# Patient Record
Sex: MALE | Race: NATIVE HAWAIIAN OR OTHER PACIFIC ISLANDER | HISPANIC OR LATINO | Employment: FULL TIME | URBAN - METROPOLITAN AREA
[De-identification: names, ages, dates, MRNs, and addresses within clinical notes are randomized per-mention and may not be internally consistent; named-entity substitution may affect disease eponyms.]

---

## 2018-07-12 RX ORDER — MICONAZOLE NITRATE 20 MG/G
CREAM TOPICAL AS NEEDED
Refills: 0 | COMMUNITY
Start: 2018-05-22 | End: 2019-05-30 | Stop reason: SDUPTHER

## 2018-07-13 ENCOUNTER — OFFICE VISIT (OUTPATIENT)
Dept: FAMILY MEDICINE CLINIC | Facility: CLINIC | Age: 28
End: 2018-07-13
Payer: COMMERCIAL

## 2018-07-13 VITALS
SYSTOLIC BLOOD PRESSURE: 114 MMHG | TEMPERATURE: 97.4 F | WEIGHT: 203.9 LBS | DIASTOLIC BLOOD PRESSURE: 82 MMHG | HEIGHT: 69 IN | OXYGEN SATURATION: 97 % | HEART RATE: 78 BPM | RESPIRATION RATE: 18 BRPM | BODY MASS INDEX: 30.2 KG/M2

## 2018-07-13 DIAGNOSIS — B37.9 YEAST INFECTION: ICD-10-CM

## 2018-07-13 DIAGNOSIS — R21: Primary | Chronic | ICD-10-CM

## 2018-07-13 LAB — GLUCOSE P FAST SERPL-MCNC: 67 MG/DL (ref 65–99)

## 2018-07-13 PROCEDURE — 99213 OFFICE O/P EST LOW 20 MIN: CPT | Performed by: FAMILY MEDICINE

## 2018-07-13 PROCEDURE — 82947 ASSAY GLUCOSE BLOOD QUANT: CPT | Performed by: FAMILY MEDICINE

## 2018-07-13 PROCEDURE — 36415 COLL VENOUS BLD VENIPUNCTURE: CPT | Performed by: FAMILY MEDICINE

## 2018-07-13 PROCEDURE — 3008F BODY MASS INDEX DOCD: CPT | Performed by: FAMILY MEDICINE

## 2018-07-13 RX ORDER — FLUCONAZOLE 150 MG/1
150 TABLET ORAL ONCE
Qty: 2 TABLET | Refills: 0 | Status: SHIPPED | OUTPATIENT
Start: 2018-07-13 | End: 2018-07-13

## 2018-07-13 NOTE — PROGRESS NOTES
Assessment/Plan:      Diagnoses and all orders for this visit:    Penile eruption  Comments:  Sexual partner had a yeast infection 2 months ago and again 2 daysago  She was treated with fluconazole, twice  Patient was treated with a cream 2 months ago  Other orders  -     ANTIFUNGAL 2 % cream; as needed          Subjective:     Patient ID: Dawn Brunson is a 33 y o  male  Patient tells me that for months he has had a penile rash which seems to come out after intercourse and if he does not shower  It has been this way for months but more recently the rash has been there constantly  It is itchy and is on the head of the penis and the distal shaft  The rest of the history is as noted-he is partner was diagnosed twice in the last 2 months with the a vaginal yeast infection  The most recent was 2 days ago and she is also being treated with Diflucan 150 mg x1 dose and then is to be repeated in 3 days  He he does not wear a condom when they have intercourse  He denies any penile discharge or other penile lesions  Review of Systems   Constitutional: Negative for activity change, appetite change, chills, fatigue and fever  HENT: Negative for congestion, hearing loss and sneezing  Eyes: Negative for discharge and visual disturbance  Respiratory: Negative for cough and wheezing  Gastrointestinal: Negative for abdominal pain, constipation, diarrhea, nausea and vomiting  Endocrine: Negative for polydipsia and polyuria  Genitourinary: Negative for discharge, dysuria, frequency, genital sores, hematuria, penile pain, penile swelling, scrotal swelling and testicular pain  Musculoskeletal: Negative for arthralgias  Skin: Negative for rash  Allergic/Immunologic: Negative for environmental allergies and food allergies  Neurological: Negative for headaches  Hematological: Negative for adenopathy  Psychiatric/Behavioral: Negative for behavioral problems and sleep disturbance  Objective:     Physical Exam   Abdominal: Soft  Bowel sounds are normal    Genitourinary: No penile tenderness  Musculoskeletal: Normal range of motion  Skin: Skin is warm and dry  Vitals reviewed

## 2018-07-13 NOTE — PATIENT INSTRUCTIONS
I discussed the situation with he and his partner  She also had an HIV test done which was negative  He refuses to have that done today  There is no question that the rash on his penis is a yeast infection  The rash did not respond to topical treatment  He and his partner are most likely playing ping-pong with the yeast infection  He tells me that his grandmother had diabetes so we need to check her blood sugar to make sure that is okay  He can be treated with fluconazole 150 mg orally x1 dose and then repeat dose in 3 days  He will call me if it does not work or if the rash comes back

## 2018-07-18 ENCOUNTER — TELEPHONE (OUTPATIENT)
Dept: FAMILY MEDICINE CLINIC | Facility: CLINIC | Age: 28
End: 2018-07-18

## 2019-05-30 ENCOUNTER — TELEPHONE (OUTPATIENT)
Dept: FAMILY MEDICINE CLINIC | Facility: CLINIC | Age: 29
End: 2019-05-30

## 2019-05-30 DIAGNOSIS — B37.9 YEAST INFECTION: Primary | ICD-10-CM

## 2019-05-30 RX ORDER — MICONAZOLE NITRATE 20 MG/G
CREAM TOPICAL 2 TIMES DAILY
Qty: 28.35 G | Refills: 3 | Status: SHIPPED | OUTPATIENT
Start: 2019-05-30 | End: 2019-10-21 | Stop reason: ALTCHOICE

## 2019-10-21 ENCOUNTER — OFFICE VISIT (OUTPATIENT)
Dept: FAMILY MEDICINE CLINIC | Facility: CLINIC | Age: 29
End: 2019-10-21
Payer: COMMERCIAL

## 2019-10-21 ENCOUNTER — TREATMENT (OUTPATIENT)
Dept: FAMILY MEDICINE CLINIC | Facility: CLINIC | Age: 29
End: 2019-10-21

## 2019-10-21 VITALS
TEMPERATURE: 97.8 F | BODY MASS INDEX: 34.42 KG/M2 | WEIGHT: 232.4 LBS | HEART RATE: 104 BPM | HEIGHT: 69 IN | SYSTOLIC BLOOD PRESSURE: 140 MMHG | DIASTOLIC BLOOD PRESSURE: 90 MMHG | OXYGEN SATURATION: 96 %

## 2019-10-21 DIAGNOSIS — L21.9 SEBORRHEIC DERMATITIS: ICD-10-CM

## 2019-10-21 DIAGNOSIS — Z23 IMMUNIZATION DUE: Primary | ICD-10-CM

## 2019-10-21 DIAGNOSIS — N52.9 ERECTILE DYSFUNCTION, UNSPECIFIED ERECTILE DYSFUNCTION TYPE: Primary | ICD-10-CM

## 2019-10-21 DIAGNOSIS — N52.9 ERECTILE DYSFUNCTION, UNSPECIFIED ERECTILE DYSFUNCTION TYPE: ICD-10-CM

## 2019-10-21 LAB
EST. AVERAGE GLUCOSE BLD GHB EST-MCNC: 108 MG/DL
HBA1C MFR BLD: 5.4 % (ref 4.2–6.3)
TESTOST SERPL-MCNC: 133 NG/DL (ref 113–1065)
TSH SERPL DL<=0.05 MIU/L-ACNC: 2.78 UIU/ML (ref 0.36–3.74)

## 2019-10-21 PROCEDURE — 36415 COLL VENOUS BLD VENIPUNCTURE: CPT | Performed by: FAMILY MEDICINE

## 2019-10-21 PROCEDURE — 90471 IMMUNIZATION ADMIN: CPT

## 2019-10-21 PROCEDURE — 3008F BODY MASS INDEX DOCD: CPT | Performed by: FAMILY MEDICINE

## 2019-10-21 PROCEDURE — 99214 OFFICE O/P EST MOD 30 MIN: CPT | Performed by: FAMILY MEDICINE

## 2019-10-21 PROCEDURE — 84443 ASSAY THYROID STIM HORMONE: CPT | Performed by: FAMILY MEDICINE

## 2019-10-21 PROCEDURE — 83036 HEMOGLOBIN GLYCOSYLATED A1C: CPT | Performed by: FAMILY MEDICINE

## 2019-10-21 PROCEDURE — 90682 RIV4 VACC RECOMBINANT DNA IM: CPT

## 2019-10-21 PROCEDURE — 84403 ASSAY OF TOTAL TESTOSTERONE: CPT | Performed by: FAMILY MEDICINE

## 2019-10-21 RX ORDER — BETAMETHASONE VALERATE 0.1 %
LOTION (ML) TOPICAL
COMMUNITY
End: 2019-10-21

## 2019-10-21 RX ORDER — HYDROCORTISONE 25 MG/ML
LOTION TOPICAL 2 TIMES DAILY
Qty: 59 ML | Refills: 12 | Status: SHIPPED | OUTPATIENT
Start: 2019-10-21

## 2019-10-21 NOTE — PATIENT INSTRUCTIONS
For the scaly scalp condition use alternating dandruff shampoos  Good choices would be T/Gel Sebulex head and shoulders or Selsun Blue      Use the hydrocortisone to have percent lotion every day morning and evening for the face and you can also use it is necessary for the scalp    Return 3 wks to discuss various options for ED

## 2019-10-21 NOTE — PROGRESS NOTES
Assessment/Plan:    No problem-specific Assessment & Plan notes found for this encounter  Diagnoses and all orders for this visit:    Immunization due  -     influenza vaccine, 2982-3913, quadrivalent, recombinant, PF, 0 5 mL, for patients 18 yr+ (FLUBLOK)    Erectile dysfunction, unspecified erectile dysfunction type  -     Testosterone; Future  -     Hemoglobin A1C  -     TSH, 3rd generation with Free T4 reflex  -     hydrocortisone 2 5 % lotion; Apply topically 2 (two) times a day    Seborrheic dermatitis    Other orders  -     Discontinue: betamethasone valerate (VALISONE) 0 1 % lotion; Apply topically Once every three days           Subjective:      Patient ID: Bharathi Fontenot is a 30 y o  male  Rash : facial / scalp : itchy + flakey : responds well to potent steroid creams ;     E D for yrs ; occ gets decent erection; The following portions of the patient's history were reviewed and updated as appropriate: allergies, current medications, past family history, past medical history, past social history, past surgical history and problem list     Review of Systems      Objective:  Vitals:    10/21/19 0843   BP: 140/90   BP Location: Left arm   Patient Position: Sitting   Cuff Size: Large   Pulse: 104   Temp: 97 8 °F (36 6 °C)   TempSrc: Temporal   SpO2: 96%   Weight: 105 kg (232 lb 6 4 oz)   Height: 5' 9 29" (1 76 m)      Physical Exam   Constitutional: He is oriented to person, place, and time  He appears well-developed and well-nourished  HENT:   Head: Normocephalic and atraumatic  Eyes: Conjunctivae are normal    Neurological: He is alert and oriented to person, place, and time  Skin: Skin is warm and dry  He is not diaphoretic  Seborrheic derm face + scalp   Psychiatric: He has a normal mood and affect

## 2020-01-24 ENCOUNTER — TELEPHONE (OUTPATIENT)
Dept: FAMILY MEDICINE CLINIC | Facility: CLINIC | Age: 30
End: 2020-01-24

## 2021-05-04 ENCOUNTER — APPOINTMENT (OUTPATIENT)
Dept: LAB | Facility: CLINIC | Age: 31
End: 2021-05-04
Payer: COMMERCIAL

## 2021-05-04 ENCOUNTER — TRANSCRIBE ORDERS (OUTPATIENT)
Dept: LAB | Facility: CLINIC | Age: 31
End: 2021-05-04

## 2021-05-04 DIAGNOSIS — R14.0 GASTRIC TYMPANY: ICD-10-CM

## 2021-05-04 DIAGNOSIS — R68.82 DECREASED LIBIDO: ICD-10-CM

## 2021-05-04 DIAGNOSIS — Z00.00 ROUTINE GENERAL MEDICAL EXAMINATION AT A HEALTH CARE FACILITY: ICD-10-CM

## 2021-05-04 DIAGNOSIS — E55.9 AVITAMINOSIS D: ICD-10-CM

## 2021-05-04 DIAGNOSIS — R14.0 GASTRIC TYMPANY: Primary | ICD-10-CM

## 2021-05-04 LAB
25(OH)D3 SERPL-MCNC: 26.1 NG/ML (ref 30–100)
ALBUMIN SERPL BCP-MCNC: 4 G/DL (ref 3.5–5)
ALP SERPL-CCNC: 86 U/L (ref 46–116)
ALT SERPL W P-5'-P-CCNC: 48 U/L (ref 12–78)
ANION GAP SERPL CALCULATED.3IONS-SCNC: 5 MMOL/L (ref 4–13)
AST SERPL W P-5'-P-CCNC: 23 U/L (ref 5–45)
BASOPHILS # BLD AUTO: 0.05 THOUSANDS/ΜL (ref 0–0.1)
BASOPHILS NFR BLD AUTO: 0 % (ref 0–1)
BILIRUB SERPL-MCNC: 1.03 MG/DL (ref 0.2–1)
BILIRUB UR QL STRIP: NEGATIVE
BUN SERPL-MCNC: 16 MG/DL (ref 5–25)
CALCIUM SERPL-MCNC: 8.9 MG/DL (ref 8.3–10.1)
CHLORIDE SERPL-SCNC: 109 MMOL/L (ref 100–108)
CHOLEST SERPL-MCNC: 207 MG/DL (ref 50–200)
CLARITY UR: CLEAR
CO2 SERPL-SCNC: 23 MMOL/L (ref 21–32)
COLOR UR: NORMAL
CREAT SERPL-MCNC: 0.78 MG/DL (ref 0.6–1.3)
EOSINOPHIL # BLD AUTO: 0.19 THOUSAND/ΜL (ref 0–0.61)
EOSINOPHIL NFR BLD AUTO: 2 % (ref 0–6)
ERYTHROCYTE [DISTWIDTH] IN BLOOD BY AUTOMATED COUNT: 13.4 % (ref 11.6–15.1)
GFR SERPL CREATININE-BSD FRML MDRD: 121 ML/MIN/1.73SQ M
GLUCOSE P FAST SERPL-MCNC: 98 MG/DL (ref 65–99)
GLUCOSE UR STRIP-MCNC: NEGATIVE MG/DL
HCT VFR BLD AUTO: 51.4 % (ref 36.5–49.3)
HDLC SERPL-MCNC: 30 MG/DL
HGB BLD-MCNC: 16.6 G/DL (ref 12–17)
HGB UR QL STRIP.AUTO: NEGATIVE
IMM GRANULOCYTES # BLD AUTO: 0.1 THOUSAND/UL (ref 0–0.2)
IMM GRANULOCYTES NFR BLD AUTO: 1 % (ref 0–2)
KETONES UR STRIP-MCNC: NEGATIVE MG/DL
LDLC SERPL CALC-MCNC: 154 MG/DL (ref 0–100)
LEUKOCYTE ESTERASE UR QL STRIP: NEGATIVE
LYMPHOCYTES # BLD AUTO: 2.57 THOUSANDS/ΜL (ref 0.6–4.47)
LYMPHOCYTES NFR BLD AUTO: 21 % (ref 14–44)
MCH RBC QN AUTO: 28.6 PG (ref 26.8–34.3)
MCHC RBC AUTO-ENTMCNC: 32.3 G/DL (ref 31.4–37.4)
MCV RBC AUTO: 89 FL (ref 82–98)
MONOCYTES # BLD AUTO: 0.89 THOUSAND/ΜL (ref 0.17–1.22)
MONOCYTES NFR BLD AUTO: 7 % (ref 4–12)
NEUTROPHILS # BLD AUTO: 8.37 THOUSANDS/ΜL (ref 1.85–7.62)
NEUTS SEG NFR BLD AUTO: 69 % (ref 43–75)
NITRITE UR QL STRIP: NEGATIVE
NONHDLC SERPL-MCNC: 177 MG/DL
NRBC BLD AUTO-RTO: 0 /100 WBCS
PH UR STRIP.AUTO: 6.5 [PH]
PLATELET # BLD AUTO: 415 THOUSANDS/UL (ref 149–390)
PMV BLD AUTO: 8.9 FL (ref 8.9–12.7)
POTASSIUM SERPL-SCNC: 3.9 MMOL/L (ref 3.5–5.3)
PROT SERPL-MCNC: 8.2 G/DL (ref 6.4–8.2)
PROT UR STRIP-MCNC: NEGATIVE MG/DL
RBC # BLD AUTO: 5.8 MILLION/UL (ref 3.88–5.62)
SODIUM SERPL-SCNC: 137 MMOL/L (ref 136–145)
SP GR UR STRIP.AUTO: 1.02 (ref 1–1.03)
TRIGL SERPL-MCNC: 117 MG/DL
TSH SERPL DL<=0.05 MIU/L-ACNC: 1.13 UIU/ML (ref 0.36–3.74)
UROBILINOGEN UR QL STRIP.AUTO: 1 E.U./DL
WBC # BLD AUTO: 12.17 THOUSAND/UL (ref 4.31–10.16)

## 2021-05-04 PROCEDURE — 85025 COMPLETE CBC W/AUTO DIFF WBC: CPT

## 2021-05-04 PROCEDURE — 82784 ASSAY IGA/IGD/IGG/IGM EACH: CPT

## 2021-05-04 PROCEDURE — 80061 LIPID PANEL: CPT

## 2021-05-04 PROCEDURE — 86255 FLUORESCENT ANTIBODY SCREEN: CPT

## 2021-05-04 PROCEDURE — 84402 ASSAY OF FREE TESTOSTERONE: CPT

## 2021-05-04 PROCEDURE — 83516 IMMUNOASSAY NONANTIBODY: CPT

## 2021-05-04 PROCEDURE — 81003 URINALYSIS AUTO W/O SCOPE: CPT

## 2021-05-04 PROCEDURE — 84443 ASSAY THYROID STIM HORMONE: CPT

## 2021-05-04 PROCEDURE — 82306 VITAMIN D 25 HYDROXY: CPT

## 2021-05-04 PROCEDURE — 36415 COLL VENOUS BLD VENIPUNCTURE: CPT

## 2021-05-04 PROCEDURE — 84403 ASSAY OF TOTAL TESTOSTERONE: CPT

## 2021-05-04 PROCEDURE — 80053 COMPREHEN METABOLIC PANEL: CPT

## 2021-05-05 LAB
ENDOMYSIUM IGA SER QL: NEGATIVE
GLIADIN PEPTIDE IGA SER-ACNC: 8 UNITS (ref 0–19)
GLIADIN PEPTIDE IGG SER-ACNC: 2 UNITS (ref 0–19)
IGA SERPL-MCNC: 294 MG/DL (ref 90–386)
TESTOST FREE SERPL-MCNC: 12 PG/ML (ref 8.7–25.1)
TESTOST SERPL-MCNC: 333 NG/DL (ref 264–916)
TTG IGA SER-ACNC: <2 U/ML (ref 0–3)
TTG IGG SER-ACNC: <2 U/ML (ref 0–5)

## 2022-01-05 ENCOUNTER — OFFICE VISIT (OUTPATIENT)
Dept: UROLOGY | Facility: CLINIC | Age: 32
End: 2022-01-05
Payer: COMMERCIAL

## 2022-01-05 ENCOUNTER — TELEPHONE (OUTPATIENT)
Dept: OTHER | Facility: OTHER | Age: 32
End: 2022-01-05

## 2022-01-05 VITALS
WEIGHT: 204 LBS | HEART RATE: 100 BPM | BODY MASS INDEX: 30.21 KG/M2 | HEIGHT: 69 IN | TEMPERATURE: 98 F | DIASTOLIC BLOOD PRESSURE: 86 MMHG | SYSTOLIC BLOOD PRESSURE: 130 MMHG | RESPIRATION RATE: 16 BRPM

## 2022-01-05 DIAGNOSIS — R68.82 LOW LIBIDO: Primary | ICD-10-CM

## 2022-01-05 DIAGNOSIS — N52.9 ERECTILE DYSFUNCTION, UNSPECIFIED ERECTILE DYSFUNCTION TYPE: ICD-10-CM

## 2022-01-05 PROCEDURE — 99203 OFFICE O/P NEW LOW 30 MIN: CPT | Performed by: PHYSICIAN ASSISTANT

## 2022-01-05 RX ORDER — TADALAFIL 10 MG/1
10 TABLET ORAL AS NEEDED
Qty: 10 TABLET | Refills: 0 | Status: SHIPPED | OUTPATIENT
Start: 2022-01-05 | End: 2022-01-05 | Stop reason: SDUPTHER

## 2022-01-05 RX ORDER — TADALAFIL 10 MG/1
10 TABLET ORAL AS NEEDED
Qty: 10 TABLET | Refills: 0 | Status: SHIPPED | OUTPATIENT
Start: 2022-01-05 | End: 2022-03-14

## 2022-01-05 NOTE — PROGRESS NOTES
1/5/2022      Chief Complaint   Patient presents with    Erectile Dysfunction     Assessment and Plan    28 y o  male new patient     1  Chronic ED and low libido  - Testosterone level from 5/4/21 was 333, which is on the lower end of normal  Prior testosterone level from 2019 was low at 133 (testing was done after 9 AM)  - Will obtain updated testosterone level  Will order additional labs including FSH, LH, prolactin, and CBC given longstanding issues since he was 26years old  Recent TSH WNL  - Will trial Cialis up to 20 mg prior to intercourse  - recommend natural remedies to improve erectile function and libido including stress management, maintaining healthy weight, regular exercise, eating healthy, and adequate sleep   - Continue sex therapy  - Will f/u in 3 months for symptom reassessment      - Patient interested in vasectomy and will set up consultation with MD  He has 5 children and is not interested in having any further children    History of Present Illness  Judi Callahan is a 28 y o  male here for new patient evaluation of erectile dysfunction and low libido x years  Most recent testosterone level from 05/04/2021 was within normal range at 333  States he has had these issues since he was 26years old  Reports issues with both maintaining and obtaining erections  He denies any prior pelvic trauma or injury  Denies any prior anabolic steroid use  Denies any current illicit drug use or substance abuse  Denies any prior sexual trauma  Denies any urinary symptoms  Denies any pain with erections or penile curvature  Denies any significant medical history  He reports normal pubertal development  Denies any prior  surgical manipulation  Denies any family history of  malignancy  He has 5 children and is interested in vasectomy  He denies any prior management for his erectile dysfunction other than over-the-counter supplements        Review of Systems   Constitutional: Negative for chills and fever  Respiratory: Negative for shortness of breath  Cardiovascular: Negative for chest pain  Gastrointestinal: Negative for abdominal pain  Genitourinary: Negative for difficulty urinating, dysuria, flank pain, frequency, genital sores, hematuria, penile discharge, penile pain, penile swelling, scrotal swelling, testicular pain and urgency  Neurological: Negative for dizziness  Psychiatric/Behavioral: Negative for dysphoric mood  The patient is not nervous/anxious  Past Medical History  Past Medical History:   Diagnosis Date    Finger injury     TRAUMA TO LEFT INDEX FINGER      X-RAY    Right ankle sprain 04/17/2006       Past Social History  History reviewed  No pertinent surgical history  Social History     Tobacco Use   Smoking Status Former Smoker    Packs/day: 0 25   Smokeless Tobacco Former User       Past Family History  History reviewed  No pertinent family history  Past Social history  Social History     Socioeconomic History    Marital status: Single     Spouse name: Not on file    Number of children: Not on file    Years of education: Not on file    Highest education level: Not on file   Occupational History    Not on file   Tobacco Use    Smoking status: Former Smoker     Packs/day: 0 25    Smokeless tobacco: Former User   Substance and Sexual Activity    Alcohol use:  Yes     Alcohol/week: 2 0 standard drinks     Types: 2 Cans of beer per week    Drug use: No    Sexual activity: Not on file   Other Topics Concern    Not on file   Social History Narrative    Not on file     Social Determinants of Health     Financial Resource Strain: Not on file   Food Insecurity: Not on file   Transportation Needs: Not on file   Physical Activity: Not on file   Stress: Not on file   Social Connections: Not on file   Intimate Partner Violence: Not on file   Housing Stability: Not on file       Current Medications  Current Outpatient Medications   Medication Sig Dispense Refill    hydrocortisone 2 5 % lotion Apply topically 2 (two) times a day (Patient not taking: Reported on 1/5/2022 ) 59 mL 12    tadalafil (CIALIS) 10 MG tablet Take 1 tablet (10 mg total) by mouth as needed for erectile dysfunction Take up to 2 tablets prior intercourse as needed  10 tablet 0     No current facility-administered medications for this visit  Allergies  Allergies   Allergen Reactions    No Active Allergies          The following portions of the patient's history were reviewed and updated as appropriate: allergies, current medications, past medical history, past social history, past surgical history and problem list       Vitals  Vitals:    01/05/22 1131   BP: 130/86   Pulse: 100   Resp: 16   Temp: 98 °F (36 7 °C)   TempSrc: Temporal   Weight: 92 5 kg (204 lb)   Height: 5' 9" (1 753 m)           Physical Exam  Physical Exam  Constitutional:       Appearance: Normal appearance  He is normal weight  HENT:      Head: Normocephalic and atraumatic  Right Ear: External ear normal       Left Ear: External ear normal    Eyes:      General: No scleral icterus  Conjunctiva/sclera: Conjunctivae normal    Cardiovascular:      Pulses: Normal pulses  Pulmonary:      Effort: Pulmonary effort is normal    Genitourinary:     Penis: Normal        Testes: Normal       Comments: Normal Michele stage  Musculoskeletal:         General: Normal range of motion  Cervical back: Normal range of motion  Skin:     General: Skin is warm and dry  Neurological:      General: No focal deficit present  Mental Status: He is alert and oriented to person, place, and time  Psychiatric:         Mood and Affect: Mood normal          Behavior: Behavior normal          Thought Content: Thought content normal          Judgment: Judgment normal            Results  No results found for this or any previous visit (from the past 1 hour(s))  ]  No results found for: PSA  Lab Results   Component Value Date    CALCIUM 8 9 05/04/2021    K 3 9 05/04/2021    CO2 23 05/04/2021     (H) 05/04/2021    BUN 16 05/04/2021    CREATININE 0 78 05/04/2021     Lab Results   Component Value Date    WBC 12 17 (H) 05/04/2021    HGB 16 6 05/04/2021    HCT 51 4 (H) 05/04/2021    MCV 89 05/04/2021     (H) 05/04/2021           Orders  Orders Placed This Encounter   Procedures    Testosterone, free, total     This is a patient instruction: Fasting preferred  Collections for men not undergoing treatment must be completed between 7am-9am ONLY  Collection time restrictions are not applicable to women or men already undergoing treatment  Standing Status:   Future     Standing Expiration Date:   1/5/2023   [facility]     Standing Status:   Future     Standing Expiration Date:   1/5/2023    Prolactin     Standing Status:   Future     Standing Expiration Date:   1/5/2023    CBC and differential     This is a patient instruction: This test is non-fasting  Please drink two glasses of water morning of bloodwork          Standing Status:   Future     Standing Expiration Date:   1/5/2023       Donice Dandy, PA-C

## 2022-01-05 NOTE — TELEPHONE ENCOUNTER
Called and spoke to patient  Informed patient of information from Richard Brown  Provided patient with good RX and address to [facility]  Patient verbalized understanding

## 2022-01-05 NOTE — TELEPHONE ENCOUNTER
Patient known to our [address] office for chronic ED and low libido  Patient was prescribed tadalafil today and stated that pharmacy advised him medication is not covered by insurance  Can try sending it to [facility]? Please advise

## 2022-03-14 DIAGNOSIS — N52.9 ERECTILE DYSFUNCTION, UNSPECIFIED ERECTILE DYSFUNCTION TYPE: ICD-10-CM

## 2022-03-14 RX ORDER — TADALAFIL 10 MG/1
10 TABLET ORAL AS NEEDED
Qty: 10 TABLET | Refills: 0 | Status: SHIPPED | OUTPATIENT
Start: 2022-03-14 | End: 2022-03-14

## 2022-03-14 RX ORDER — TADALAFIL 10 MG/1
10 TABLET ORAL AS NEEDED
Qty: 10 TABLET | Refills: 0 | Status: SHIPPED | OUTPATIENT
Start: 2022-03-14 | End: 2022-04-06 | Stop reason: SDUPTHER

## 2022-03-17 NOTE — TELEPHONE ENCOUNTER
Patient calling in for refill  Patient informed that it was already sent to pharmacy on 3/14  Advised patient to call pharmacy to verify that refill is there and if he has any issues he can call back

## 2022-04-05 NOTE — PROGRESS NOTES
4/6/2022      Chief Complaint   Patient presents with    Follow-up     Assessment and Plan    1  Chronic ED and low libido  - Testosterone level from 5/4/21 was 333, which is on the lower end of normal  Prior testosterone level from 2019 was low at 133 (testing was done after 9 AM)  - obtain updated labs as ordered including testosterone, FSH, LH, prolactin, and CBC given longstanding issue since he was 26years old  TSH from May 2021 was normal      - patient is doing very well with Cialis, Rx refilled    2  Encounter for sterilization  - Scheduled with MD on 4/26/22 for vasectomy consult    History of Present Illness  Christy Reese is a 33 y o  male here for follow up evaluation of  longstanding erectile dysfunction and low libido  He was advised to obtain updated labs prior to office visit today and he did not do so  He has had these issues since the age of 26years old  He has issues with both maintaining and obtaining erections  He was trialed on Cialis at last office visit  He is doing very well on Cialis and states this has greatly improved his quality of life  He denies any adverse side effects  He denies any prior pelvic trauma or injury  Denies any prior anabolic steroid use  Denies any current illicit drug use or substance abuse  Denies any prior sexual trauma  Denies any urinary symptoms  Denies any pain with erections or penile curvature  Denies any significant medical history  He reports normal pubertal development  Denies any prior  surgical manipulation  Denies any family history of  malignancy  He has 5 children and is interested in vasectomy  Review of Systems   Constitutional: Negative for chills and fever  Respiratory: Negative for shortness of breath  Cardiovascular: Negative for chest pain  Gastrointestinal: Negative for abdominal pain  Genitourinary: Negative for difficulty urinating, dysuria, flank pain, frequency, hematuria and urgency  Neurological: Negative for dizziness  Past Medical History  Past Medical History:   Diagnosis Date    Finger injury     TRAUMA TO LEFT INDEX FINGER      X-RAY    Right ankle sprain 04/17/2006       Past Social History  History reviewed  No pertinent surgical history  Social History     Tobacco Use   Smoking Status Former Smoker    Packs/day: 0 25   Smokeless Tobacco Former User       Past Family History  History reviewed  No pertinent family history  Past Social history  Social History     Socioeconomic History    Marital status: Single     Spouse name: Not on file    Number of children: Not on file    Years of education: Not on file    Highest education level: Not on file   Occupational History    Not on file   Tobacco Use    Smoking status: Former Smoker     Packs/day: 0 25    Smokeless tobacco: Former User   Substance and Sexual Activity    Alcohol use: Yes     Alcohol/week: 2 0 standard drinks     Types: 2 Cans of beer per week    Drug use: No    Sexual activity: Not on file   Other Topics Concern    Not on file   Social History Narrative    Not on file     Social Determinants of Health     Financial Resource Strain: Not on file   Food Insecurity: Not on file   Transportation Needs: Not on file   Physical Activity: Not on file   Stress: Not on file   Social Connections: Not on file   Intimate Partner Violence: Not on file   Housing Stability: Not on file       Current Medications  Current Outpatient Medications   Medication Sig Dispense Refill    tadalafil (CIALIS) 10 MG tablet Take 1 tablet (10 mg total) by mouth as needed for erectile dysfunction Take up to 2 tablets prior intercourse as needed  10 tablet 0    hydrocortisone 2 5 % lotion Apply topically 2 (two) times a day (Patient not taking: Reported on 4/6/2022 ) 59 mL 12     No current facility-administered medications for this visit         Allergies  Allergies   Allergen Reactions    No Active Allergies          The following portions of the patient's history were reviewed and updated as appropriate: allergies, current medications, past medical history, past social history, past surgical history and problem list       Vitals  Vitals:    04/06/22 1040   BP: 130/70   BP Location: Left arm   Patient Position: Sitting   Cuff Size: Large   Pulse: 99   Resp: 16   SpO2: 99%   Weight: 93 kg (205 lb)   Height: 5' 9" (1 753 m)           Physical Exam  Physical Exam  Constitutional:       Appearance: Normal appearance  HENT:      Head: Normocephalic and atraumatic  Right Ear: External ear normal       Left Ear: External ear normal    Eyes:      General: No scleral icterus  Conjunctiva/sclera: Conjunctivae normal    Cardiovascular:      Pulses: Normal pulses  Pulmonary:      Effort: Pulmonary effort is normal    Musculoskeletal:         General: Normal range of motion  Cervical back: Normal range of motion  Neurological:      General: No focal deficit present  Mental Status: He is alert and oriented to person, place, and time  Psychiatric:         Mood and Affect: Mood normal          Behavior: Behavior normal          Thought Content: Thought content normal          Judgment: Judgment normal        Results  No results found for this or any previous visit (from the past 1 hour(s))  ]  No results found for: PSA  Lab Results   Component Value Date    CALCIUM 8 9 05/04/2021    K 3 9 05/04/2021    CO2 23 05/04/2021     (H) 05/04/2021    BUN 16 05/04/2021    CREATININE 0 78 05/04/2021     Lab Results   Component Value Date    WBC 12 17 (H) 05/04/2021    HGB 16 6 05/04/2021    HCT 51 4 (H) 05/04/2021    MCV 89 05/04/2021     (H) 05/04/2021           Orders  No orders of the defined types were placed in this encounter        Nubia Collado PA-C

## 2022-04-06 ENCOUNTER — OFFICE VISIT (OUTPATIENT)
Dept: UROLOGY | Facility: CLINIC | Age: 32
End: 2022-04-06
Payer: COMMERCIAL

## 2022-04-06 VITALS
HEIGHT: 69 IN | HEART RATE: 99 BPM | SYSTOLIC BLOOD PRESSURE: 130 MMHG | WEIGHT: 205 LBS | BODY MASS INDEX: 30.36 KG/M2 | DIASTOLIC BLOOD PRESSURE: 70 MMHG | OXYGEN SATURATION: 99 % | RESPIRATION RATE: 16 BRPM

## 2022-04-06 DIAGNOSIS — N52.9 ERECTILE DYSFUNCTION, UNSPECIFIED ERECTILE DYSFUNCTION TYPE: ICD-10-CM

## 2022-04-06 PROCEDURE — 99213 OFFICE O/P EST LOW 20 MIN: CPT | Performed by: PHYSICIAN ASSISTANT

## 2022-04-06 RX ORDER — TADALAFIL 10 MG/1
10 TABLET ORAL AS NEEDED
Qty: 30 TABLET | Refills: 2 | Status: SHIPPED | OUTPATIENT
Start: 2022-04-06

## 2022-05-05 ENCOUNTER — TELEPHONE (OUTPATIENT)
Dept: UROLOGY | Facility: CLINIC | Age: 32
End: 2022-05-05

## 2022-05-05 NOTE — TELEPHONE ENCOUNTER
[facility] reminding patient to obtain labs that were ordered in January 2022  Office number provided for patient if he has questions

## 2022-10-25 DIAGNOSIS — N52.9 ERECTILE DYSFUNCTION, UNSPECIFIED ERECTILE DYSFUNCTION TYPE: ICD-10-CM

## 2022-10-25 RX ORDER — TADALAFIL 10 MG/1
10 TABLET ORAL AS NEEDED
Qty: 30 TABLET | Refills: 2 | Status: SHIPPED | OUTPATIENT
Start: 2022-10-25